# Patient Record
(demographics unavailable — no encounter records)

---

## 2025-03-12 NOTE — DISCUSSION/SUMMARY
[FreeTextEntry1] : REASON FOR CONSULT  Jason Urbano is a 69-year-old male who was seen 3/12/2025 for a discussion regarding his genetic testing results related to hereditary cancer predisposition.   RELEVANT MEDICAL HISTORY  Mr. Urbano reports a history of melanoma in situ approximately 30-35 years ago on his knee for which he describes contained all disease in the original biopsy. He recalls an additional larger excision was made which did not contain any residual disease.   Mr. Urbano also reports he was diagnosed with pancreatic cancer at age 59. 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 Mr. Urbano states he continues to follow with his Oncologist, Dr. Mesa, at [facility] yearly per Mr. Urbano's request.  Mr. Urbano pursued genetic testing using [facility] 84-gene Multi-Cancer Panel. 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 This test was ordered by Dr. Javier Borden and reported on 2024.   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 Urbano -	Total Polyps: Solely 1 polyp on first colonoscopy 30 years ago per Mr. Urbano Skin:   -	Most recent skin exam: 3/11/2025; Results: reportedly multiple biopsies, results pending  Prostate:  -	PSA: 2024; Results: 4.77 (H) Mr. Urbano states he is followed by Urology every 6 months and likely had an additional PSA evaluated since 2024. He reports that his PSA "runs high" and was initially identified to be "significantly elevated" in  with a subsequent negative prostate biopsy. He states he has been followed by Urology q6months since this time and underwent an additional prostate biopsy approximately 3 years ago for a 3mm lesion following rising PSA. He states this was also negative -	JYOTI: as needed with Urologist/per patient's own request Other: -	Most recent endoscopy: ; Results: reportedly showing hiatal hernia; Scheduled 2025 per Mr. Urbano -	Most recent ERCP: 2 years ago; Results: reportedly to monitor biliary stricture   SOCIAL HISTORY:  -	Tobacco-product use: Yes, former smoker in the early-mid 1970s, 1 pack per day. -	Environmental exposure: None    FAMILY HISTORY:  Maternal ancestry was reported as [place] and paternal ancestry was reported as [place]. A detailed family history of cancer was ascertained, see below and scanned pedigree in chart.   To Mr. Urbano's knowledge, no one in the family has had germline testing for cancer susceptibility.      RESULTS INTERPRETATION AND ASSESSMENT:   PATHOGENIC MUTATION IN AUDRA    We reviewed with Mr. 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 Mr. Urbano was informed that individuals with a single pathogenic AUDRA mutation have increased risks for the following:    FEMALE BREAST CANCER RISK:  Lifetime risk for female breast cancer is estimated to be 21-24% compared to the general population risk of 12.9%.    OVARIAN CANCER RISK:  Lifetime risk to develop ovarian cancer is estimated to be up to 2-3% compared to the general population risk of approximately 1.2%.      PANCREATIC CANCER RISK:  Lifetime risk to develop pancreatic cancer is estimated to up to 5-10% compared to the general population risk of 1.6%.    OTHER CANCER RISKS  Some studies have found associations with prostate and colon cancer in families with AUDRA mutations. However, there is currently insufficient data to determine the strength of these associations.    VARIANT OF UNCERTAIN SIGNIFICANCE IN DIS3L2    In addition, a variant of uncertain significance (VUS) was detected in the DIS3L2 gene. At this time the available evidence is insufficient to determine the role of this VUS in disease and the clinical significance of this result is uncertain. Bi-allelic (2 copies) pathogenic mutations in the DIS3L2 gene are associated with Perlman Syndrome, an autosomal recessive overgrowth syndrome characterized by polyhydramnios, fetal ascites, fetal/ macrosomia, hypotonia, organomegaly, renal dysplasia, nephroblastomatosis, and high risk of Wilms tumor. There is PRELIMINARY evidence that mono-allelic pathogenic mutations in the DIS3L2 gene may be associated with an increased risk for Wilms tumor, however, the available evidence is insufficient to make a determination regarding this relationship. It is unknown if the patient has an increased reproductive risk for Perlman Syndrome or possible increased risk for Wilms tumor associated with DIS3L2 at this time.     The detection of this VUS should not currently change the patient's medical management. It is not recommended at this time that family members use this VUS result for predictive genetic testing or medical management decisions. With more research, a VUS may be reclassified as either disease-causing or benign. The patient was encouraged to contact us every 2-3 years to inquire about any new information for this variant.     We also discussed that, while no complete cause of the patient's personal and family history of cancer was identified, this result does entirely not rule out a hereditary cancer risk in the patient. It is possible, although unlikely, the patient has a mutation in one of the genes tested that is not detectable by this analysis, or has a mutation in a different gene, either known or unknown. It is also possible there is a hereditary cancer predisposition in the family, but the patient did not inherit it.    IMPLICATIONS FOR THE PATIENT:  Given Mr. Urbano's personal and current reported family history of cancer, and his AUDRA positive genetic test results, the following screening guidelines and risk-reducing recommendations were discussed:    PANCREAS:  - Given his prior diagnosis, long-term management and surveillance should be based on Mr. Urbano's post-treatment protocol as recommended by his oncology team.   - At this time, there is no proven effective screening for pancreatic cancer, though the National Comprehensive Cancer Network (NCCN) states that investigational screening may be considered for individuals who have both an inherited mutation associated with an increased risk of pancreatic cancer and a family history of the disease.   -We discussed investigational pancreatic cancer screening in the setting of a high-volume clinic may be considered. We briefly reviewed the options for screening modality including contrast-enhanced MRI/magnetic resonance cholangiopancreatography (MRCP) and/or endoscopic ultrasound (EUS).  - Given Mr. Urbano's AUDRA mutation and his history of pancreatic cancer, we discussed that he may meet with the [facility] to discuss the utility and pros/cons of the above screening in the setting of his prior Whipple.  - In addition, we recommended that such screening only take place after an in-depth discussion about the potential limitations to screening, including, cost, the high incidence of pancreatic abnormalities, and uncertainties about the potential benefits of pancreatic cancer screening.   - Mr. Urbano states he has briefly reviewed screening options with [facility] team over the phone and has had a discussion with his oncologist at [facility] as well. He describes how his oncologist reviewed with him limited clinical utility of further pancreas screening, particularly in the setting of his prior Whipple. Therefore, Mr. Urbano states he is leaning away from pancreas screening consideration at this time. He was encouraged to follow-up with [facility] as well as his Oncologist for continued pros/cons discussions. - Additionally, Cancer Genetics is happy to have a follow-up discussion with one of our team's Cancer Geneticists at any time.  PROSTATE: We encouraged Mr. Urbano to follow-up for long-term management and surveillance as recommended by his Urologist in the setting of prior elevated PSA values.  DERM: Long-term management and surveillance should be based on Mr. Urbano's post-treatment protocol as recommended by his derm care team.    OTHER: In the absence of other indications, Mr. Urbano should practice age-appropriate cancer screening of other organ systems as recommended for the general population.    IMPLICATIONS FOR FAMILY MEMBERS:  This mutation is inherited in an autosomal dominant pattern. We recommend the patient's first-degree relatives, specifically his sisters and children, pursue genetic counseling and genetic testing as there is a 50% chance they also have the same mutation. We discussed his sisters may also consider genetic counseling and genetic testing related to his mother's history of pancreatic cancer in addition to the known familial AUDRA mutation. Mr. Urbano was made aware that if any at-risk relatives wanted to pursue genetic testing any time in the future, we would be happy to see them and coordinate testing.  If they are not local, they can locate a genetic counselor using the National Society of Genetic Counselors, Find a Genetic Counselor Tool (www.nsgc.org/findageneticcounselor).   REPRODUCTIVE IMPLICATIONS AND OPTIONS  The risk of passing on this mutation to a future generation is 50%.   Please note, individuals with a single pathogenic mutation in AUDRA are NOT known to have Ataxia-Telangiectasia (A-T). A-T is caused by bi-allelic (two copies) mutations in the AUDRA gene and is characterized by progressive cerebellar ataxia, oculomotor apraxia, choreoathetosis, telangiectasias of the conjunctivae, immunodeficiency, frequent infections, and an increased risk for cancers such as leukemia and lymphoma.  For those of reproductive age, we recommend the partner of an individual who is an AUDRA carrier also have AUDRA genetic testing to assess the risk of having a child affected with this condition if it would inform reproductive decision making. If both individuals carry a single AUDRA mutation, there is a 25% chance for each pregnancy to be affected with an A-T-like condition.    RESOURCES & SUPPORT GROUPS  Facing Our Risk of cancer Empowered (FORCE): www.FacingOurRisk.org     In addition, the oncology social workers at [facility] are available to assist with more referrals, if necessary.      PLAN:  1. See above note for recommended management.  2. We encouraged sharing these results with family members. They have a risk to have inherited the same mutation. Other family may benefit from genetic testing and should contact a certified genetic counselor specializing in cancer. Due to HIPAA and [place] laws, Genetics is unable to directly contact other family at risk, but we are available should family members wish to reach out to us  3. Family support resources and referrals were provided.   4. Patient informed consult note(s) will be available through their [facility] patient portal.   5. Mr. Urbano signed a medical release to allow discussion of his genetics information with his daughters. This will be scanned into his chart.  6. Genetic knowledge changes rapidly. We encouraged re-contacting Cancer Genetics every 2-3 years for any changes in screening recommendations or sooner if there are significant changes in personal or family history    For any additional questions please call Cancer Genetics at [phone].       Lucinda Segundo MS, [facility]  Genetic Counselor, Cancer Genetics    CC:   Patient  Galina Torres NP

## 2025-06-12 NOTE — HISTORY OF PRESENT ILLNESS
[FreeTextEntry1] : CPE. [de-identified] : Patient is a 69yo male with PMH IFG, BPH, celiac disease, GERD, hx pancreatic cancer s/p whipple who presents to the office for CPE.    Last CPE:  06/11/2024 with myself.  Colonoscopy:  04/2025, Dr. Borden; s/p polypectomy, recommended 3-year f/u. Ophthalmology: 2024. Dermatology:  KARLA, Sarah Lara ([facility]).  S/p Mohs on lip, BCC on arm this year. Dentist:  KARLA. PSA:  4.55 in 06/2025, stable from 4.77 in 06/2024.  Pt follows with Dr. Fox regularly, will forward results for review. Has hx negative prostate bx in the past.   Testicular self-exams:  no reported abnormalities.  Regular testicular self-exams encouraged.  Shingles: Shingrix x2; 12/2020, 02/2021.  Flu:  10/2024. Tdap: 01/2022.  PNA: PCV13 12/2020, PPSV 01/2022.  COVID: received.    Patient had b/w done prior and reviewed with patient in the office today.  Glucose 118 (was not fasting), A1C 5.7% (improved from 5.8% last year).  Lifestyle modifications encouraged.  PSA minimally elevated at 4.55, stable from 2023/2024.  Pt follows with Dr. Fox regularly, will forward results for review. Has hx negative prostate bx in the past.  Remainder of labs WNL.   Cardiology:  Dr. Servin.  Oncology:  Dr. Benja Mesa ([facility]). Proctologist:  Dr. Trejo.

## 2025-06-12 NOTE — ASSESSMENT
[Vaccines Reviewed] : Immunizations reviewed today. Please see immunization details in the vaccine log within the immunization flowsheet.  [FreeTextEntry1] : Patient is a 69yo male with PMH IFG, BPH, celiac disease, GERD who presents to the office for CPE.  Health Maintenance - UTD with routine HCM. - B/w done prior and reviewed with patient in the office today. - EKG deferred, pt follows closely with Dr. Servin. - Eat plenty of fruits and vegetables, especially deeply colored fruits/vegetables (such as leafy greens, peaches) that are more nutrient-dense.  Continue to work hard on diet and exercise, limiting/avoiding saturated fat, fatty foods, greasy foods, red meats, white flour-based carbohydrates (cookies, cakes, white bread, white rice), and added sugars.  Chose whole grain foods and products made with whole grains over refined grains and white flour-based carbohydrates.  Avoid beverages and food with added sugar.  Limit salt intake to improve blood pressure.  Limit alcohol intake. - Try and incorporate a minimum of 150 minutes of exercise per week of moderate activity.  You should also try to incorporate ~20 minutes of weight training to your regimen at least 2-3 times per week.  IFG/Prediabetes - A1C improved from last year.  Glucose elevated at 118 but pt was not fasting. - Limit/avoid white flour-based carbohydrates (white bread, white rice, pasta, cake, cookies, etc.) and limit added sugar in your diet (sugary drinks, sugar in coffee/tea, alcohol, candy, etc.).  Recommend pt continue regular follow up with all specialists.  Call the office or go to the ED immediately if you develop new, worsening or concerning symptoms including high fever, severe headache/worst headache of your life, confusion, dizziness/lightheadedness, loss of consciousness, severe chest pain, difficulty breathing, shortness of breath, severe abdominal pain, excessive vomiting/diarrhea, inability to feel/move the extremities, or any other concerning symptoms.

## 2025-06-12 NOTE — HEALTH RISK ASSESSMENT
[Former] : Former [> 15 Years] : > 15 Years [HIV test declined] : HIV test declined [Hepatitis C test declined] : Hepatitis C test declined [None] : None [With Significant Other] : lives with significant other [Employed] : employed [] :  [# Of Children ___] : has [unfilled] children [Feels Safe at Home] : Feels safe at home [Fully functional (bathing, dressing, toileting, transferring, walking, feeding)] : Fully functional (bathing, dressing, toileting, transferring, walking, feeding) [Fully functional (using the telephone, shopping, preparing meals, housekeeping, doing laundry, using] : Fully functional and needs no help or supervision to perform IADLs (using the telephone, shopping, preparing meals, housekeeping, doing laundry, using transportation, managing medications and managing finances) [No] : In the past 12 months have you used drugs other than those required for medical reasons? No [No falls in past year] : Patient reported no falls in the past year [0] : 2) Feeling down, depressed, or hopeless: Not at all (0) [PHQ-2 Negative - No further assessment needed] : PHQ-2 Negative - No further assessment needed [With Patient/Caregiver] : , with patient/caregiver [Reviewed no changes] : Reviewed, no changes [I will adhere to the patient's wishes.] : I will adhere to the patient's wishes. [Audit-CScore] : 0 [de-identified] : exercises regularly; walking, bike, gym, active in general [de-identified] : fairly well balanced; gluten free, admits to sweets [[id number]] : 0 [EyeExamDate] : 2024 [Patient reported colonoscopy was abnormal] : Patient reported colonoscopy was abnormal [Change in mental status noted] : No change in mental status noted [Language] : denies difficulty with language [ColonoscopyDate] : 04/2025 [ColonoscopyComments] : s/p polypectomy, rpt 3 years. [FreeTextEntry2] : teacher; machine shop at college part time.